# Patient Record
Sex: FEMALE | Race: WHITE | NOT HISPANIC OR LATINO | Employment: PART TIME | URBAN - METROPOLITAN AREA
[De-identification: names, ages, dates, MRNs, and addresses within clinical notes are randomized per-mention and may not be internally consistent; named-entity substitution may affect disease eponyms.]

---

## 2024-10-06 ENCOUNTER — HOSPITAL ENCOUNTER (EMERGENCY)
Facility: HOSPITAL | Age: 39
Discharge: HOME | End: 2024-10-06

## 2024-10-06 ENCOUNTER — APPOINTMENT (OUTPATIENT)
Dept: RADIOLOGY | Facility: HOSPITAL | Age: 39
End: 2024-10-06

## 2024-10-06 VITALS
HEIGHT: 63 IN | BODY MASS INDEX: 21.79 KG/M2 | SYSTOLIC BLOOD PRESSURE: 114 MMHG | DIASTOLIC BLOOD PRESSURE: 79 MMHG | HEART RATE: 88 BPM | OXYGEN SATURATION: 100 % | TEMPERATURE: 97.9 F | WEIGHT: 123 LBS | RESPIRATION RATE: 16 BRPM

## 2024-10-06 DIAGNOSIS — B96.89 BACTERIAL VAGINOSIS: ICD-10-CM

## 2024-10-06 DIAGNOSIS — N76.0 BACTERIAL VAGINOSIS: ICD-10-CM

## 2024-10-06 DIAGNOSIS — N83.201 HEMORRHAGIC CYST OF RIGHT OVARY: Primary | ICD-10-CM

## 2024-10-06 LAB
ALBUMIN SERPL BCP-MCNC: 4.5 G/DL (ref 3.4–5)
ALP SERPL-CCNC: 42 U/L (ref 33–110)
ALT SERPL W P-5'-P-CCNC: 9 U/L (ref 7–45)
ANION GAP SERPL CALCULATED.3IONS-SCNC: 11 MMOL/L (ref 10–20)
APPEARANCE UR: CLEAR
AST SERPL W P-5'-P-CCNC: 14 U/L (ref 9–39)
BASOPHILS # BLD AUTO: 0.05 X10*3/UL (ref 0–0.1)
BASOPHILS NFR BLD AUTO: 0.8 %
BILIRUB SERPL-MCNC: 0.5 MG/DL (ref 0–1.2)
BILIRUB UR STRIP.AUTO-MCNC: NEGATIVE MG/DL
BUN SERPL-MCNC: 13 MG/DL (ref 6–23)
CALCIUM SERPL-MCNC: 9.3 MG/DL (ref 8.6–10.3)
CHLORIDE SERPL-SCNC: 107 MMOL/L (ref 98–107)
CLUE CELLS SPEC QL WET PREP: PRESENT
CO2 SERPL-SCNC: 25 MMOL/L (ref 21–32)
COLOR UR: YELLOW
CREAT SERPL-MCNC: 0.68 MG/DL (ref 0.5–1.05)
EGFRCR SERPLBLD CKD-EPI 2021: >90 ML/MIN/1.73M*2
EOSINOPHIL # BLD AUTO: 0.08 X10*3/UL (ref 0–0.7)
EOSINOPHIL NFR BLD AUTO: 1.4 %
ERYTHROCYTE [DISTWIDTH] IN BLOOD BY AUTOMATED COUNT: 14.8 % (ref 11.5–14.5)
GLUCOSE SERPL-MCNC: 77 MG/DL (ref 74–99)
GLUCOSE UR STRIP.AUTO-MCNC: NORMAL MG/DL
HCG UR QL IA.RAPID: NEGATIVE
HCT VFR BLD AUTO: 37.3 % (ref 36–46)
HGB BLD-MCNC: 11.6 G/DL (ref 12–16)
IMM GRANULOCYTES # BLD AUTO: 0.01 X10*3/UL (ref 0–0.7)
IMM GRANULOCYTES NFR BLD AUTO: 0.2 % (ref 0–0.9)
KETONES UR STRIP.AUTO-MCNC: NEGATIVE MG/DL
LEUKOCYTE ESTERASE UR QL STRIP.AUTO: NEGATIVE
LYMPHOCYTES # BLD AUTO: 1.97 X10*3/UL (ref 1.2–4.8)
LYMPHOCYTES NFR BLD AUTO: 33.3 %
MCH RBC QN AUTO: 27.9 PG (ref 26–34)
MCHC RBC AUTO-ENTMCNC: 31.1 G/DL (ref 32–36)
MCV RBC AUTO: 90 FL (ref 80–100)
MONOCYTES # BLD AUTO: 0.59 X10*3/UL (ref 0.1–1)
MONOCYTES NFR BLD AUTO: 10 %
MUCOUS THREADS #/AREA URNS AUTO: NORMAL /LPF
NEUTROPHILS # BLD AUTO: 3.21 X10*3/UL (ref 1.2–7.7)
NEUTROPHILS NFR BLD AUTO: 54.3 %
NITRITE UR QL STRIP.AUTO: NEGATIVE
NRBC BLD-RTO: 0 /100 WBCS (ref 0–0)
PH UR STRIP.AUTO: 5.5 [PH]
PLATELET # BLD AUTO: 249 X10*3/UL (ref 150–450)
POTASSIUM SERPL-SCNC: 3.9 MMOL/L (ref 3.5–5.3)
PROT SERPL-MCNC: 7.4 G/DL (ref 6.4–8.2)
PROT UR STRIP.AUTO-MCNC: NORMAL MG/DL
RBC # BLD AUTO: 4.16 X10*6/UL (ref 4–5.2)
RBC # UR STRIP.AUTO: NEGATIVE /UL
RBC #/AREA URNS AUTO: NORMAL /HPF
SODIUM SERPL-SCNC: 139 MMOL/L (ref 136–145)
SP GR UR STRIP.AUTO: 1.03
SQUAMOUS #/AREA URNS AUTO: NORMAL /HPF
T VAGINALIS SPEC QL WET PREP: ABNORMAL
UROBILINOGEN UR STRIP.AUTO-MCNC: NORMAL MG/DL
WBC # BLD AUTO: 5.9 X10*3/UL (ref 4.4–11.3)
WBC #/AREA URNS AUTO: NORMAL /HPF
WBC VAG QL WET PREP: ABNORMAL
YEAST VAG QL WET PREP: ABNORMAL

## 2024-10-06 PROCEDURE — 80053 COMPREHEN METABOLIC PANEL: CPT | Performed by: PHYSICIAN ASSISTANT

## 2024-10-06 PROCEDURE — 99284 EMERGENCY DEPT VISIT MOD MDM: CPT | Mod: 25

## 2024-10-06 PROCEDURE — 76830 TRANSVAGINAL US NON-OB: CPT | Performed by: RADIOLOGY

## 2024-10-06 PROCEDURE — 87210 SMEAR WET MOUNT SALINE/INK: CPT | Performed by: PHYSICIAN ASSISTANT

## 2024-10-06 PROCEDURE — 36415 COLL VENOUS BLD VENIPUNCTURE: CPT | Performed by: PHYSICIAN ASSISTANT

## 2024-10-06 PROCEDURE — 96374 THER/PROPH/DIAG INJ IV PUSH: CPT

## 2024-10-06 PROCEDURE — 85025 COMPLETE CBC W/AUTO DIFF WBC: CPT | Performed by: PHYSICIAN ASSISTANT

## 2024-10-06 PROCEDURE — 81025 URINE PREGNANCY TEST: CPT | Performed by: PHYSICIAN ASSISTANT

## 2024-10-06 PROCEDURE — 76856 US EXAM PELVIC COMPLETE: CPT | Performed by: RADIOLOGY

## 2024-10-06 PROCEDURE — 81001 URINALYSIS AUTO W/SCOPE: CPT | Performed by: PHYSICIAN ASSISTANT

## 2024-10-06 PROCEDURE — 76856 US EXAM PELVIC COMPLETE: CPT

## 2024-10-06 PROCEDURE — 87491 CHLMYD TRACH DNA AMP PROBE: CPT | Mod: WESLAB | Performed by: PHYSICIAN ASSISTANT

## 2024-10-06 PROCEDURE — 2500000004 HC RX 250 GENERAL PHARMACY W/ HCPCS (ALT 636 FOR OP/ED): Performed by: PHYSICIAN ASSISTANT

## 2024-10-06 RX ORDER — IBUPROFEN 600 MG/1
600 TABLET ORAL EVERY 6 HOURS PRN
Qty: 21 TABLET | Refills: 0 | Status: SHIPPED | OUTPATIENT
Start: 2024-10-06

## 2024-10-06 RX ORDER — AZITHROMYCIN 500 MG/1
1000 TABLET, FILM COATED ORAL ONCE
Status: DISCONTINUED | OUTPATIENT
Start: 2024-10-06 | End: 2024-10-06

## 2024-10-06 RX ORDER — METRONIDAZOLE 500 MG/1
500 TABLET ORAL 2 TIMES DAILY
Qty: 14 TABLET | Refills: 0 | Status: SHIPPED | OUTPATIENT
Start: 2024-10-06 | End: 2024-10-13

## 2024-10-06 RX ORDER — KETOROLAC TROMETHAMINE 30 MG/ML
15 INJECTION, SOLUTION INTRAMUSCULAR; INTRAVENOUS ONCE
Status: COMPLETED | OUTPATIENT
Start: 2024-10-06 | End: 2024-10-06

## 2024-10-06 ASSESSMENT — PAIN SCALES - GENERAL
PAINLEVEL_OUTOF10: 3
PAINLEVEL_OUTOF10: 5 - MODERATE PAIN
PAINLEVEL_OUTOF10: 5 - MODERATE PAIN

## 2024-10-06 ASSESSMENT — COLUMBIA-SUICIDE SEVERITY RATING SCALE - C-SSRS
6. HAVE YOU EVER DONE ANYTHING, STARTED TO DO ANYTHING, OR PREPARED TO DO ANYTHING TO END YOUR LIFE?: NO
1. IN THE PAST MONTH, HAVE YOU WISHED YOU WERE DEAD OR WISHED YOU COULD GO TO SLEEP AND NOT WAKE UP?: NO
2. HAVE YOU ACTUALLY HAD ANY THOUGHTS OF KILLING YOURSELF?: NO

## 2024-10-06 ASSESSMENT — PAIN DESCRIPTION - DESCRIPTORS: DESCRIPTORS: SHARP;PRESSURE

## 2024-10-06 ASSESSMENT — PAIN DESCRIPTION - PAIN TYPE: TYPE: ACUTE PAIN

## 2024-10-06 ASSESSMENT — PAIN DESCRIPTION - LOCATION: LOCATION: ABDOMEN

## 2024-10-06 ASSESSMENT — PAIN - FUNCTIONAL ASSESSMENT: PAIN_FUNCTIONAL_ASSESSMENT: 0-10

## 2024-10-06 NOTE — ED PROVIDER NOTES
HPI   Chief Complaint   Patient presents with    Abdominal Pain       This is a 39-year-old female presenting to the emergency department with complaints of lower abdominal discomfort that started Friday evening after sexual intercourse.  The patient states during sexual intercourse she was not having any pain but immediately after started to have a sharp pain in her lower pelvic region, it was not one-sided.  She states that the pain lasted for approximately an hour and then she did fall asleep.  She states that yesterday and today the pain was dull but still present.  She denies any vaginal burning, itching, or abnormal discharge.  She denies dysuria, hematuria, increased urgency or frequency.  No nausea, vomiting, diarrhea or constipation.  No fever or chills.  She does have a history of PCOS and notes that she has previous ovarian cysts.  The patient does not have any upper abdominal discomfort.  No chest pain, shortness of breath.  She denies flank pain but does have lower back discomfort that is radiating from the front      Please see HPI for pertinent positive and negative ROS.         Patient History   No past medical history on file.  No past surgical history on file.  No family history on file.  Social History     Tobacco Use    Smoking status: Not on file    Smokeless tobacco: Not on file   Substance Use Topics    Alcohol use: Not on file    Drug use: Not on file       Physical Exam   ED Triage Vitals [10/06/24 1313]   Temperature Heart Rate Respirations BP   36.6 °C (97.9 °F) 90 16 124/83      Pulse Ox Temp Source Heart Rate Source Patient Position   100 % Temporal Monitor Sitting      BP Location FiO2 (%)     Right arm --       Physical Exam  GENERAL APPEARANCE: Awake and alert. No acute respiratory distress.   VITAL SIGNS: As per the nurses' triage record.  HEENT: Normocephalic, atraumatic.   NECK: Soft, nontender and supple  CHEST: Nontender to palpation. Clear to auscultation bilaterally. Symmetric  rise and fall of chest wall.   HEART: Clear S1 and S2. Regular rate and rhythm.  Strong and equal pulses in the extremities.  ABDOMEN: Soft, nontender, nondistended.  Patient has no guarding, rigidity, or rebound tenderness to suggest acute abdomen.  No CVA tenderness bilaterally.  No overlying skin changes of the back or the abdomen.  GENITAL: Carol Cotto RN was chaperone.  Vulvar skin shows no lesions, rashes or vesicles.  Vaginal canal shows no bleeding.  There is a malodorous white discharge.  No lesions in the vaginal canal.  Cervical os is closed with no cervical petechiae.  No cervical motion tenderness.  MUSCULOSKELETAL: Full gross active range of motion. Ambulating on own with no acute difficulties  NEUROLOGICAL: Awake, alert and oriented x 3. Patient answering questions appropriately.   IMMUNOLOGICAL: No lymphatic streaking noted  DERMATOLOGIC: Warm and dry   PYSCH: Cooperative with appropriate mood and affect.    ED Course & MDM   Diagnoses as of 10/06/24 0529   Hemorrhagic cyst of right ovary   Bacterial vaginosis                 No data recorded     Bernabe Coma Scale Score: 15 (10/06/24 1313 : Nain Mcnulty, EMT)                           Medical Decision Making  Parts of this chart have been completed using voice recognition software. Please excuse any errors of transcription.  My thought process and reason for plan has been formulated from the time that I saw the patient until the time of disposition and is not specific to one specific moment during their visit and furthermore my MDM encompasses this entire chart and not only this text box.      HPI: Detailed above.    Exam: A medically appropriate exam performed, outlined above, given the known history and presentation.    History obtained from: Patient    Medications given during visit:  Medications   ketorolac (Toradol) injection 15 mg (15 mg intravenous Given 10/6/24 1352)        Diagnostic/tests  Labs Reviewed   CBC WITH AUTO  DIFFERENTIAL - Abnormal       Result Value    WBC 5.9      nRBC 0.0      RBC 4.16      Hemoglobin 11.6 (*)     Hematocrit 37.3      MCV 90      MCH 27.9      MCHC 31.1 (*)     RDW 14.8 (*)     Platelets 249      Neutrophils % 54.3      Immature Granulocytes %, Automated 0.2      Lymphocytes % 33.3      Monocytes % 10.0      Eosinophils % 1.4      Basophils % 0.8      Neutrophils Absolute 3.21      Immature Granulocytes Absolute, Automated 0.01      Lymphocytes Absolute 1.97      Monocytes Absolute 0.59      Eosinophils Absolute 0.08      Basophils Absolute 0.05     WET PREP, GENITAL - Abnormal    Trichomonas None Seen      Clue Cells Present (*)     Yeast None Seen      WBC 1-2     COMPREHENSIVE METABOLIC PANEL - Normal    Glucose 77      Sodium 139      Potassium 3.9      Chloride 107      Bicarbonate 25      Anion Gap 11      Urea Nitrogen 13      Creatinine 0.68      eGFR >90      Calcium 9.3      Albumin 4.5      Alkaline Phosphatase 42      Total Protein 7.4      AST 14      Bilirubin, Total 0.5      ALT 9     HCG, URINE, QUALITATIVE - Normal    HCG, Urine NEGATIVE     URINALYSIS WITH REFLEX CULTURE AND MICROSCOPIC - Normal    Color, Urine Yellow      Appearance, Urine Clear      Specific Gravity, Urine 1.026      pH, Urine 5.5      Protein, Urine 10 (TRACE)      Glucose, Urine Normal      Blood, Urine NEGATIVE      Ketones, Urine NEGATIVE      Bilirubin, Urine NEGATIVE      Urobilinogen, Urine Normal      Nitrite, Urine NEGATIVE      Leukocyte Esterase, Urine NEGATIVE     URINALYSIS WITH REFLEX CULTURE AND MICROSCOPIC    Narrative:     The following orders were created for panel order Urinalysis with Reflex Culture and Microscopic.  Procedure                               Abnormality         Status                     ---------                               -----------         ------                     Urinalysis with Reflex C...[[id number]]  Normal              Final result               Extra Urine Ambrocio  Tube[[id number]]                            In process                   Please view results for these tests on the individual orders.   EXTRA URINE GRAY TUBE   C. TRACHOMATIS + N. GONORRHOEAE, AMPLIFIED   URINALYSIS MICROSCOPIC WITH REFLEX CULTURE    WBC, Urine 1-5      RBC, Urine NONE      Squamous Epithelial Cells, Urine 1-9 (SPARSE)      Mucus, Urine 3+        US PELVIS TRANSABDOMINAL WITH TRANSVAGINAL   Final Result   1. 3.0 cm heterogeneous lesion at the right ovary, may represent a   collapsed hemorrhagic cyst. Follow-up pelvic ultrasound in 6-12 weeks   recommended to ensure resolution and exclude a different etiology.   2. 2.1 cm cyst superior to the right ovary, may represent a   paraovarian cyst.        MACRO:   Critical Finding:  See findings. Notification was initiated on   10/6/2024 at 4:20 pm by  Vy Stroud.  (**-YCF-**) Instructions:             Signed by: Vy Stroud 10/6/2024 4:26 PM   Dictation workstation:   [id number]           Considerations/further MDM:    The patient does present with stable vital signs.  Blood pressure 124/83, temperature 97.9 °F, heart 90 bpm, respirations 16, 100% room air    Differential diagnosis includes but not limited to ovarian cyst/ruptured ovarian cyst versus ovarian abscess versus ovarian torsion versus appendicitis versus ectopic pregnancy versus musculoskeletal pain of the abdominal wall after intercourse versus cystitis/acute UTI versus STI    CBC shows no leukocytosis or acute anemia.  Urinalysis shows no evidence of urinary tract infection.  Negative urine pregnancy test.  CMP shows no electro disturbance, acute kidney injury or abnormal hepatic function.  Wet prep is negative for trichomonas, yeast but there is clue cells present that is consistent with my physical exam findings.  Ultrasound of the pelvis shows a 3 cm heterogeneous lesion on the right ovary which may represent a collapsed hemorrhagic cyst.  There is also a 2.1 cm superior right  paraovarian cyst.  I did discuss ultrasound findings with OB/GYN on-call, Dr. León.  She recommends follow-up ultrasound in 6 to 12 weeks.  I did test patient for gonorrhea and chlamydia however the patient does not want to be empirically treated for these right now and she will follow-up with results with her primary care doctor.  I do suspect that cyst are etiology of patient's lower abdominal discomfort following intercourse and/or musculoskeletal pain.  She has no fever, leukocytosis, vomiting or diarrhea to suggest appendicitis or other surgical abdominal findings that would require CT scan.  Her abdomen was soft and benign.  The patient was discharged with a prescription for metronidazole for BV and ibuprofen for pelvic pain.  She is given strict return precautions to the emerged part.  She is educated to avoid sexual activity until she has all STI testing results.  She was referred to PCP and OB/GYN.  The patient felt comfortable discharge plan home, we thoroughly went over labs and imaging.  She was discharged in stable condition.      Procedure  Procedures     Cathie Nichols PA-C  10/06/24 0242

## 2024-10-06 NOTE — DISCHARGE INSTRUCTIONS
Take metronidazole for bacterial vaginosis.  Please take ibuprofen for pelvic pain.  Please follow-up with primary care doctor and OB/GYN list on her discharge paperwork.  If you begin to have worsening abdominal pain including nausea, vomiting, urinary complaints, fever, chills, inability to eat, diarrhea return to the emergency department for reevaluation    Is recommended that you have a repeat ultrasound in 6 to 12 weeks to evaluate cyst at that time.

## 2024-10-07 LAB
C TRACH RRNA SPEC QL NAA+PROBE: NEGATIVE
HOLD SPECIMEN: NORMAL
N GONORRHOEA DNA SPEC QL PROBE+SIG AMP: NEGATIVE

## 2024-11-15 ENCOUNTER — APPOINTMENT (OUTPATIENT)
Dept: OBSTETRICS AND GYNECOLOGY | Facility: CLINIC | Age: 39
End: 2024-11-15

## 2024-11-15 VITALS
HEIGHT: 64 IN | BODY MASS INDEX: 22.2 KG/M2 | SYSTOLIC BLOOD PRESSURE: 116 MMHG | DIASTOLIC BLOOD PRESSURE: 60 MMHG | WEIGHT: 130 LBS

## 2024-11-15 DIAGNOSIS — N83.209 CYST OF OVARY, UNSPECIFIED LATERALITY: Primary | ICD-10-CM

## 2024-11-15 PROCEDURE — 1036F TOBACCO NON-USER: CPT | Performed by: OBSTETRICS & GYNECOLOGY

## 2024-11-15 PROCEDURE — 3008F BODY MASS INDEX DOCD: CPT | Performed by: OBSTETRICS & GYNECOLOGY

## 2024-11-15 PROCEDURE — 76830 TRANSVAGINAL US NON-OB: CPT | Performed by: OBSTETRICS & GYNECOLOGY

## 2024-11-15 PROCEDURE — 99204 OFFICE O/P NEW MOD 45 MIN: CPT | Performed by: OBSTETRICS & GYNECOLOGY
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
